# Patient Record
Sex: MALE
[De-identification: names, ages, dates, MRNs, and addresses within clinical notes are randomized per-mention and may not be internally consistent; named-entity substitution may affect disease eponyms.]

---

## 2018-09-04 ENCOUNTER — HOSPITAL ENCOUNTER (OUTPATIENT)
Dept: HOSPITAL 31 - C.ER | Age: 63
Setting detail: OBSERVATION
LOS: 2 days | Discharge: HOME | End: 2018-09-06
Attending: INTERNAL MEDICINE | Admitting: INTERNAL MEDICINE
Payer: COMMERCIAL

## 2018-09-04 VITALS — RESPIRATION RATE: 20 BRPM

## 2018-09-04 DIAGNOSIS — I10: ICD-10-CM

## 2018-09-04 DIAGNOSIS — E10.9: ICD-10-CM

## 2018-09-04 DIAGNOSIS — L50.9: Primary | ICD-10-CM

## 2018-09-04 DIAGNOSIS — Z79.4: ICD-10-CM

## 2018-09-04 DIAGNOSIS — D72.829: ICD-10-CM

## 2018-09-04 LAB
ALBUMIN SERPL-MCNC: 4.1 G/DL (ref 3.5–5)
ALBUMIN/GLOB SERPL: 1.3 {RATIO} (ref 1–2.1)
ALT SERPL-CCNC: 25 U/L (ref 21–72)
AST SERPL-CCNC: 37 U/L (ref 17–59)
BASE EXCESS BLDV CALC-SCNC: -4.6 MMOL/L (ref 0–2)
BASOPHILS # BLD AUTO: 0.1 K/UL (ref 0–0.2)
BASOPHILS NFR BLD: 0.8 % (ref 0–2)
BILIRUB UR-MCNC: NEGATIVE MG/DL
BUN SERPL-MCNC: 19 MG/DL (ref 9–20)
CALCIUM SERPL-MCNC: 9.2 MG/DL (ref 8.6–10.4)
EOSINOPHIL # BLD AUTO: 0 K/UL (ref 0–0.7)
EOSINOPHIL NFR BLD: 0.2 % (ref 0–4)
ERYTHROCYTE [DISTWIDTH] IN BLOOD BY AUTOMATED COUNT: 13.6 % (ref 11.5–14.5)
GFR NON-AFRICAN AMERICAN: > 60
GLUCOSE UR STRIP-MCNC: (no result) MG/DL
HGB BLD-MCNC: 16.9 G/DL (ref 12–18)
LEUKOCYTE ESTERASE UR-ACNC: (no result) LEU/UL
LYMPHOCYTES # BLD AUTO: 1.8 K/UL (ref 1–4.3)
LYMPHOCYTES NFR BLD AUTO: 12.4 % (ref 20–40)
MCH RBC QN AUTO: 27.8 PG (ref 27–31)
MCHC RBC AUTO-ENTMCNC: 34.1 G/DL (ref 33–37)
MCV RBC AUTO: 81.6 FL (ref 80–94)
MONOCYTES # BLD: 0.4 K/UL (ref 0–0.8)
MONOCYTES NFR BLD: 2.9 % (ref 0–10)
NEUTROPHILS # BLD: 12.2 K/UL (ref 1.8–7)
NEUTROPHILS NFR BLD AUTO: 83.7 % (ref 50–75)
NRBC BLD AUTO-RTO: 0.1 % (ref 0–2)
PCO2 BLDV: 32 MMHG (ref 40–60)
PH BLDV: 7.39 [PH] (ref 7.32–7.43)
PH UR STRIP: 5 [PH] (ref 5–8)
PLATELET # BLD: 235 K/UL (ref 130–400)
PMV BLD AUTO: 8.5 FL (ref 7.2–11.7)
PROT UR STRIP-MCNC: NEGATIVE MG/DL
RBC # BLD AUTO: 6.05 MIL/UL (ref 4.4–5.9)
RBC # UR STRIP: NEGATIVE /UL
SP GR UR STRIP: 1.03 (ref 1–1.03)
UROBILINOGEN UR-MCNC: NORMAL MG/DL (ref 0.2–1)
VENOUS BLOOD GAS PO2: 49 MM/HG (ref 30–55)
WBC # BLD AUTO: 14.6 K/UL (ref 4.8–10.8)

## 2018-09-04 PROCEDURE — 86039 ANTINUCLEAR ANTIBODIES (ANA): CPT

## 2018-09-04 PROCEDURE — 81001 URINALYSIS AUTO W/SCOPE: CPT

## 2018-09-04 PROCEDURE — 80053 COMPREHEN METABOLIC PANEL: CPT

## 2018-09-04 PROCEDURE — 87086 URINE CULTURE/COLONY COUNT: CPT

## 2018-09-04 PROCEDURE — 86703 HIV-1/HIV-2 1 RESULT ANTBDY: CPT

## 2018-09-04 PROCEDURE — 85651 RBC SED RATE NONAUTOMATED: CPT

## 2018-09-04 PROCEDURE — 71045 X-RAY EXAM CHEST 1 VIEW: CPT

## 2018-09-04 PROCEDURE — 86162 COMPLEMENT TOTAL (CH50): CPT

## 2018-09-04 PROCEDURE — 99285 EMERGENCY DEPT VISIT HI MDM: CPT

## 2018-09-04 PROCEDURE — 86140 C-REACTIVE PROTEIN: CPT

## 2018-09-04 PROCEDURE — 96372 THER/PROPH/DIAG INJ SC/IM: CPT

## 2018-09-04 PROCEDURE — 82948 REAGENT STRIP/BLOOD GLUCOSE: CPT

## 2018-09-04 PROCEDURE — 82607 VITAMIN B-12: CPT

## 2018-09-04 PROCEDURE — 36415 COLL VENOUS BLD VENIPUNCTURE: CPT

## 2018-09-04 PROCEDURE — 96374 THER/PROPH/DIAG INJ IV PUSH: CPT

## 2018-09-04 PROCEDURE — 86592 SYPHILIS TEST NON-TREP QUAL: CPT

## 2018-09-04 PROCEDURE — 86038 ANTINUCLEAR ANTIBODIES: CPT

## 2018-09-04 PROCEDURE — 82803 BLOOD GASES ANY COMBINATION: CPT

## 2018-09-04 PROCEDURE — 82784 ASSAY IGA/IGD/IGG/IGM EACH: CPT

## 2018-09-04 PROCEDURE — 85025 COMPLETE CBC W/AUTO DIFF WBC: CPT

## 2018-09-04 PROCEDURE — 87040 BLOOD CULTURE FOR BACTERIA: CPT

## 2018-09-04 PROCEDURE — 86360 T CELL ABSOLUTE COUNT/RATIO: CPT

## 2018-09-04 PROCEDURE — 86160 COMPLEMENT ANTIGEN: CPT

## 2018-09-04 PROCEDURE — 86738 MYCOPLASMA ANTIBODY: CPT

## 2018-09-04 RX ADMIN — METHYLPREDNISOLONE SODIUM SUCCINATE SCH MG: 40 INJECTION, POWDER, FOR SOLUTION INTRAMUSCULAR; INTRAVENOUS at 21:53

## 2018-09-04 RX ADMIN — METHYLPREDNISOLONE SODIUM SUCCINATE SCH: 40 INJECTION, POWDER, FOR SOLUTION INTRAMUSCULAR; INTRAVENOUS at 15:21

## 2018-09-04 RX ADMIN — HUMAN INSULIN SCH UNIT: 100 INJECTION, SOLUTION SUBCUTANEOUS at 17:08

## 2018-09-04 RX ADMIN — HUMAN INSULIN SCH UNIT: 100 INJECTION, SOLUTION SUBCUTANEOUS at 23:12

## 2018-09-04 NOTE — C.PDOC
History Of Present Illness


62 y/o  male, w/PMhx of diabetes, presents to the ER complaining of red raised 

rash and itching to bilateral anterior cubital fossa , hands, feet, and back of 

head which has been present for the past few days. Patient states that he was 

seen in [facility] 3 days and he was prescribed Cephalaxin, Prednisone and Benadryl. 

However, he felt worse so he returned to [facility] for a 2nd visit the same day. He 

continued taking the medications with out relief. His symptoms worsened and he 

has pain and swelling to bottom of bilateral feet. He is unable to bear weight. 

Denies having fever, cough, runny nose, chest pain, SOB, nausea, vomiting, and 

diarrhea.


Time Seen by Provider: 09/04/18 10:16


Chief Complaint (Nursing): Abnormal Skin Integrity


History Per: Patient


History/Exam Limitations: no limitations


Onset/Duration Of Symptoms: Days


Current Symptoms Are (Timing): Still Present


Severity: Moderate





Past Medical History


Reviewed: Historical Data, Nursing Documentation, Vital Signs


Vital Signs: 


 Last Vital Signs











Temp  99.0 F   09/04/18 10:29


 


Pulse  119 H  09/04/18 10:14


 


Resp  18   09/04/18 10:14


 


BP  153/84 H  09/04/18 10:14


 


Pulse Ox  96   09/04/18 12:14














- Medical History


PMH: Diabetes


Surgical History: No Surg Hx


Family History: States: No Known Family Hx





Review Of Systems


Except As Marked, All Systems Reviewed And Found Negative.


Constitutional: Negative for: Fever, Chills


ENT: Negative for: Nose Discharge


Cardiovascular: Negative for: Chest Pain


Respiratory: Negative for: Cough, Shortness of Breath


Gastrointestinal: Negative for: Nausea, Vomiting, Diarrhea


Skin: Positive for: Rash (anterior cubital fossa, hands, feet, torso, back of 

head)





Physical Exam





- Physical Exam


Appears: Non-toxic, No Acute Distress


Skin: Warm, Dry, Rash (raised urticaria to bilateral anterior cubital fossa , 

hands, feet, and back of head )


Head: Atraumatic, Normacephalic


Eye(s): bilateral: Normal Inspection


Nose: Normal


Oral Mucosa: Moist


Throat: Normal, No Erythema, No Exudate


Neck: Supple


Chest: Symmetrical


Cardiovascular: Rhythm Regular


Respiratory: Normal Breath Sounds, No Rales, No Rhonchi, No Wheezing


Neurological/Psych: Oriented x3, Normal Speech





ED Course And Treatment





- Laboratory Results


Result Diagrams: 


 09/04/18 10:47





 09/04/18 10:47


O2 Sat by Pulse Oximetry: 96 (RA)


Pulse Ox Interpretation: Normal





- Other Rad


  ** CXR


X-Ray: Viewed By Me, Read By Radiologist


Interpretation: Date of service:  09/04/2018.  HISTORY:  Sepsis Patient.  

COMPARISON:  No prior.  FINDINGS:  LUNGS:  Low-normal inspiration.  No 

consolidation.  PLEURA:  No significant pleural effusion identified, no 

pneumothorax apparent.  CARDIOVASCULAR:  Normal. Probable prominent vessels 

seen on end the perihilar location.  OSSEOUS STRUCTURES:  No significant 

abnormalities.  VISUALIZED UPPER ABDOMEN:  Mild asymmetrical elevation the 

right hemidiaphragm.  OTHER FINDINGS:  None.  IMPRESSION:  No consolidation to 

suggest an infiltrate.





- Physician Consult Information


Time Consulting Physician Contacted: 12:08


Physician Contacted: Harvey Brennan


Outcome Of Conversation: Dr.Pandya olivares consulting with ID.





Medical Decision Making


Medical Decision Making: 


Plan:


--Labs


--UA


--CXR


--Benadryl IV


--Prednisone IV


--Morphine IV





Disposition


Discussed With Dr.: Harvey Brennan


Counseled Patient/Family Regarding: Studies Performed, Need For Followup





- Disposition


Disposition: HOSPITALIZED


Disposition Time: 12:09


Forms:  [employer] (English)





- Clinical Impression


Clinical Impression: 


 Skin lesion, Hives








- Scribe Statement


The provider has reviewed the documentation as recorded by the Yulianaibartur Arteaga


Provider Attestation: 





All medical record entries made by the Scribe were at my direction and 

personally dictated by me. I have reviewed the chart and agree that the record 

accurately reflects my personal performance of the history, physical exam, 

medical decision making, and the department course for this patient. I have 

also personally directed, reviewed, and agree with the discharge instructions 

and disposition.





Decision To Admit





- Pt Status Changed To:


Hospital Disposition Of: Observation





- .


Bed Request Type: Regular


Admitting Physician: Harvey Brennan


Patient Diagnosis: 


 Skin lesion, Hives

## 2018-09-04 NOTE — RAD
Date of service: 



09/04/2018



HISTORY:

Sepsis Patient  



COMPARISON:

No prior. 



FINDINGS:



LUNGS:

Low-normal inspiration.



No consolidation.



PLEURA:

No significant pleural effusion identified, no pneumothorax apparent.



CARDIOVASCULAR:

Normal. Probable prominent vessels seen on end the perihilar 

location. 



OSSEOUS STRUCTURES:

No significant abnormalities.



VISUALIZED UPPER ABDOMEN:

Mild asymmetrical elevation the right hemidiaphragm



OTHER FINDINGS:

None.



IMPRESSION:

No consolidation to suggest an infiltrate.

## 2018-09-04 NOTE — CP.PCM.HP
History of Present Illness





- History of Present Illness


History of Present Illness: 





CHIEF COMPLAINTS TODAY :


Pain in the bilateral feet and hands secondary to new skin lesion


2 days ago patient broke out skin lesions urticarial type both feet in the 

process of the upper and lower extremities.  Presented to [facility] was given oral antibiotics and prednisone without any relief 

patient continues to have increased pain in both feet and unable to walk.


Patient has a history of diabetes no previous history of any allergies or skin 

lesions or travel outside the country





ROS.


HEENT :  N.


Resp :       No cough, wheezing ,pleuritic CP ,or hemoptysis 


Cardio :     No anginal  CP, PND, orthopnea, palpitation 


GI :           No abd.pain, n/v ,diarrhea or GI bleeding .


CNS : No headache, vertigo, focal deficit.


Musculoskel :  No joint swelling ,


Derm :        Urticarial rash


Psych :     Normal affect.


Ext :  No  swelling ,calf pain 





PE.


Pt. is alert awake in no distress.


V.S  As noted in the chart 


Head ,ear nose,throat and eyes : Normal.


Neck : Supple with normal carotids.


Lungs: Clear air entry.


Heart : S1 & S2 normal with S4. No murmur.


Abd : Soft non tender with normal bowel sounds.


Neuro : Moves all ext. with no localized deficit.


Ext : No edema with intact pulses.Non tender calves 


Derm : Erythematous urticarial wheals on both antecubital and knee fossa.


LABS/RADIOLOGY:





ASSESSMENT/PLAN : 


Generalized urticaria etiology unclear


Type 2 diabetes.








Present on Admission





- Present on Admission


Any Indicators Present on Admission: No





Past Patient History





- Past Social History


Smoking Status: Never Smoked





- CARDIAC


Hx Hypertension: Yes





- ENDOCRINE/METABOLIC


Hx Diabetes Mellitus Type 1: Yes





- PSYCHIATRIC


Hx Substance Use: No





- SURGICAL HISTORY


Hx Surgeries: No





- ANESTHESIA


Hx Anesthesia: No





Meds


Allergies/Adverse Reactions: 


 Allergies











Allergy/AdvReac Type Severity Reaction Status Date / Time


 


No Known Allergies Allergy   Verified 09/04/18 10:17














Results





- Vital Signs


Recent Vital Signs: 





 Last Vital Signs











Temp  99.1 F   09/04/18 12:23


 


Pulse  118 H  09/04/18 12:23


 


Resp  20   09/04/18 12:23


 


BP  161/81 H  09/04/18 12:23


 


Pulse Ox  98   09/04/18 12:23














- Labs


Result Diagrams: 


 09/04/18 10:47





 09/04/18 10:47


Labs: 





 Laboratory Results - last 24 hr











  09/04/18 09/04/18 09/04/18





  10:47 10:47 11:47


 


WBC  14.6 H  


 


RBC  6.05 H  


 


Hgb  16.9  


 


Hct  49.4  


 


MCV  81.6  


 


MCH  27.8  


 


MCHC  34.1  


 


RDW  13.6  


 


Plt Count  235  


 


MPV  8.5  


 


Neut % (Auto)  83.7 H  


 


Lymph % (Auto)  12.4 L  


 


Mono % (Auto)  2.9  


 


Eos % (Auto)  0.2  


 


Baso % (Auto)  0.8  


 


Neut # (Auto)  12.2 H  


 


Lymph # (Auto)  1.8  


 


Mono # (Auto)  0.4  


 


Eos # (Auto)  0.0  


 


Baso # (Auto)  0.1  


 


ESR   


 


pO2    49


 


VBG pH    7.39


 


VBG pCO2    32 L


 


VBG HCO3    21.0


 


VBG Total CO2    20.4 L


 


VBG O2 Sat (Calc)    88.5 H


 


VBG Base Excess    -4.6 L


 


VBG Potassium    3.8


 


Glucose    235 H


 


Lactate    2.5 H


 


Sodium   140  137.0


 


Potassium   4.4 


 


Chloride   103  109.0 H


 


Carbon Dioxide   19 L 


 


Anion Gap   22 H 


 


BUN   19 


 


Creatinine   0.7 L 


 


Est GFR ( Amer)   > 60 


 


Est GFR (Non-Af Amer)   > 60 


 


Random Glucose   273 H 


 


Calcium   9.2 


 


Total Bilirubin   0.9 


 


AST   37 


 


ALT   25 


 


Alkaline Phosphatase   74 


 


C-React Prot High Sens   


 


Total Protein   7.4 


 


Albumin   4.1 


 


Globulin   3.3 


 


Albumin/Globulin Ratio   1.3 


 


Venous Blood Potassium    3.8


 


Urine Color   


 


Urine Clarity   


 


Urine pH   


 


Ur Specific Gravity   


 


Urine Protein   


 


Urine Glucose (UA)   


 


Urine Ketones   


 


Urine Blood   


 


Urine Nitrate   


 


Urine Bilirubin   


 


Urine Urobilinogen   


 


Ur Leukocyte Esterase   


 


Urine WBC (Auto)   














  09/04/18 09/04/18 09/04/18





  12:03 12:12 12:12


 


WBC   


 


RBC   


 


Hgb   


 


Hct   


 


MCV   


 


MCH   


 


MCHC   


 


RDW   


 


Plt Count   


 


MPV   


 


Neut % (Auto)   


 


Lymph % (Auto)   


 


Mono % (Auto)   


 


Eos % (Auto)   


 


Baso % (Auto)   


 


Neut # (Auto)   


 


Lymph # (Auto)   


 


Mono # (Auto)   


 


Eos # (Auto)   


 


Baso # (Auto)   


 


ESR   13 


 


pO2   


 


VBG pH   


 


VBG pCO2   


 


VBG HCO3   


 


VBG Total CO2   


 


VBG O2 Sat (Calc)   


 


VBG Base Excess   


 


VBG Potassium   


 


Glucose   


 


Lactate   


 


Sodium   


 


Potassium   


 


Chloride   


 


Carbon Dioxide   


 


Anion Gap   


 


BUN   


 


Creatinine   


 


Est GFR ( Amer)   


 


Est GFR (Non-Af Amer)   


 


Random Glucose   


 


Calcium   


 


Total Bilirubin   


 


AST   


 


ALT   


 


Alkaline Phosphatase   


 


C-React Prot High Sens    > 15.00 H


 


Total Protein   


 


Albumin   


 


Globulin   


 


Albumin/Globulin Ratio   


 


Venous Blood Potassium   


 


Urine Color  Yellow  


 


Urine Clarity  Clear  


 


Urine pH  5.0  


 


Ur Specific Gravity  1.029  


 


Urine Protein  Negative  


 


Urine Glucose (UA)  3+ H  


 


Urine Ketones  Negative  


 


Urine Blood  Negative  


 


Urine Nitrate  Negative  


 


Urine Bilirubin  Negative  


 


Urine Urobilinogen  Normal  


 


Ur Leukocyte Esterase  Neg  


 


Urine WBC (Auto)  < 1

## 2018-09-04 NOTE — CP.PCM.CON
History of Present Illness





- History of Present Illness


History of Present Illness: 


INFECTIOUS DISEASE CONSULT;


HPI;


63-year-old male with history of insulin-dependent diabetes mellitus, who 

presents to [facility] ER on 9/4/18 with complains of generalized 

urticarial rash on trunk lower extremities bilateral hands and feet and also to 

the face with edema of the hands and as reported by the daughter facial edema.


Patient had presented to [facility] 3 days ago and was 

prescribed Keflex, prednisone and Benadryl, however he felt worse so he 

returned to [facility] for a second visit to same day.  He was treated with steroids 

and discharged with same medications.  His symptoms worsened especially the 

pain both feet with  swelling off both his feet and hands.  He states he was 

unable to bear weight and decided to come to the ER.


Patient denies having fever, cough, shortness of breath, dysphagia, nausea, 

vomiting or diarrhea.


Patient advised admission for observation and started on steroids, Benadryl and 

H2 blockers, and analgesics.


Patient denies any an allergic trigger was on per any new medication, food or 

insect sting.  States this is the first time it has ever happened.


patient denies any recent history off upper respiratory tract infection or 

intake of seafood.


Patient denies any history off allergies or previous skin conditions or recent 

travel.


INFECTIOUS DISEASE CONSULTATION REQUESTE BY PMD LEUKOCYTOSIS , HIVES VERSUS 

ALLERGIC REACTION.








PMH: Diabetes


Surgical History: No Surg Hx


Family History: States: No Known Family Hx.





Allergies;; NKA.


























Review of Systems





- Constitutional


Constitutional: absent: Chills, Fever, Weight Loss





- EENT


Eyes: absent: Change in Vision


Ears: absent: Ear Discharge, Ear Pain


Nose/Mouth/Throat: absent: Nasal Congestion, Nasal Obstruction, Dysphagia, 

Hoarsness, Mouth Lesions, Throat Swelling, Tongue Swelling, Neck Pain





- Cardiovascular


Cardiovascular: Pedal Edema.  absent: Chest Pain, Dyspnea





- Respiratory


Respiratory: absent: Cough, Dyspnea, Chest Congestion





- Gastrointestinal


Gastrointestinal: absent: Abdominal Pain, Diarrhea, Nausea, Odynophagia, 

Vomiting





- Genitourinary


Genitourinary: absent: Difficulty Urinating, Dysuria, Bladder Distension





- Musculoskeletal


Additional comments: 





BILATERAL HAND SWELLING AND ERYTHEMA AND SWELLING BOTH FEET PLANTAR ASPECT.





- Neurological


Neurological: Burning Sensations (BILATERAL FEETAND HANDS.).  absent: Headaches





- Hematologic/Lymphatic


Hematologic: As Per HPI.  absent: Lymphadenopathy





Past Patient History





- Past Social History


Smoking Status: Never Smoked





- CARDIAC


Hx Hypertension: Yes





- ENDOCRINE/METABOLIC


Hx Diabetes Mellitus Type 1: Yes





- PSYCHIATRIC


Hx Substance Use: No





- SURGICAL HISTORY


Hx Surgeries: No





- ANESTHESIA


Hx Anesthesia: No





Meds


Allergies/Adverse Reactions: 


 Allergies











Allergy/AdvReac Type Severity Reaction Status Date / Time


 


No Known Allergies Allergy   Verified 09/04/18 10:17














- Medications


Medications: 


 Current Medications





Diphenhydramine HCl (Benadryl)  50 mg IM Q8H PRN


   PRN Reason: Allergy symptoms


Docusate Sodium (Colace)  100 mg PO DAILY [facility]


Gabapentin (Neurontin)  300 mg PO BID [facility]


   Last Admin: 09/04/18 18:03 Dose:  300 mg


Heparin Sodium (Porcine) (Heparin)  5,000 units SC Q12 [facility]


   Last Admin: 09/04/18 21:55 Dose:  5,000 units


Insulin Human Regular (Novolin R)  0 unit SC ACHS [facility]


   PRN Reason: Protocol


   Last Admin: 09/04/18 17:08 Dose:  3 unit


Methylprednisolone (Solu-Medrol)  40 mg IVP Q8 [facility]


   Last Admin: 09/04/18 21:53 Dose:  40 mg


Morphine Sulfate (Morphine)  4 mg IV Q6 PRN


   PRN Reason: Pain, moderate (4-7)


Pantoprazole Sodium (Protonix Inj)  40 mg IVP DAILY [facility]


   Last Admin: 09/04/18 17:08 Dose:  40 mg











Physical Exam





- Head Exam


Head Exam: NORMAL INSPECTION, NORMOCEPHALIC





- Eye Exam


Eye Exam: EOMI, PERRL.  absent: Scleral icterus


Pupil Exam: PERRL





- ENT Exam


ENT Exam: Mucous Membranes Moist, Normal Oropharynx


Additional comments: 





SOME FACIAL EDEMA





- Neck Exam


Neck exam: Positive for: Full Rom, Normal Inspection.  Negative for: 

Lymphadenopathy, Thyromegaly





- Respiratory Exam


Respiratory Exam: NORMAL BREATHING PATTERN





- Cardiovascular Exam


Cardiovascular Exam: REGULAR RHYTHM, +S1, +S2





- GI/Abdominal Exam


GI & Abdominal Exam: Normal Bowel Sounds, Soft.  absent: Organomegaly, 

Tenderness





- Extremities Exam


Extremities exam: Positive for: normal capillary refill, pedal edema, tenderness

, pedal pulses present.  Negative for: calf tenderness





- Back Exam


Back exam: rash noted (PATIENT HAS TRUNCAL URTICARIAL RASH EXTENDING ONTO 

BILATERAL LOWER EXTREMITIES, UPPER EXTREMITIES, FACIAL AND BILATERAL FEET AND 

HANDS INCLUDING, PALMS AND SOLES.)





- Neurological Exam


Neurological exam: Alert, CN II-XII Intact, Oriented x3, Reflexes Normal





- Psychiatric Exam


Psychiatric exam: Normal Mood





- Skin


Skin Exam: Normal Color, Urticaria, Warm





Results





- Vital Signs


Recent Vital Signs: 


 Last Vital Signs











Temp  97.9 F   09/04/18 18:57


 


Pulse  100 H  09/04/18 18:57


 


Resp  20   09/04/18 18:57


 


BP  137/75   09/04/18 18:57


 


Pulse Ox  94 L  09/04/18 18:57














- Labs


Result Diagrams: 


 09/04/18 10:47





 09/04/18 10:47


Labs: 


 Laboratory Results - last 24 hr











  09/04/18 09/04/18 09/04/18





  10:47 10:47 11:47


 


WBC  14.6 H  


 


RBC  6.05 H  


 


Hgb  16.9  


 


Hct  49.4  


 


MCV  81.6  


 


MCH  27.8  


 


MCHC  34.1  


 


RDW  13.6  


 


Plt Count  235  


 


MPV  8.5  


 


Neut % (Auto)  83.7 H  


 


Lymph % (Auto)  12.4 L  


 


Mono % (Auto)  2.9  


 


Eos % (Auto)  0.2  


 


Baso % (Auto)  0.8  


 


Neut # (Auto)  12.2 H  


 


Lymph # (Auto)  1.8  


 


Mono # (Auto)  0.4  


 


Eos # (Auto)  0.0  


 


Baso # (Auto)  0.1  


 


ESR   


 


pO2    49


 


VBG pH    7.39


 


VBG pCO2    32 L


 


VBG HCO3    21.0


 


VBG Total CO2    20.4 L


 


VBG O2 Sat (Calc)    88.5 H


 


VBG Base Excess    -4.6 L


 


VBG Potassium    3.8


 


Glucose    235 H


 


Lactate    2.5 H


 


Sodium   140  137.0


 


Potassium   4.4 


 


Chloride   103  109.0 H


 


Carbon Dioxide   19 L 


 


Anion Gap   22 H 


 


BUN   19 


 


Creatinine   0.7 L 


 


Est GFR ( Amer)   > 60 


 


Est GFR (Non-Af Amer)   > 60 


 


POC Glucose (mg/dL)   


 


Random Glucose   273 H 


 


Calcium   9.2 


 


Total Bilirubin   0.9 


 


AST   37 


 


ALT   25 


 


Alkaline Phosphatase   74 


 


C-React Prot High Sens   


 


Total Protein   7.4 


 


Albumin   4.1 


 


Globulin   3.3 


 


Albumin/Globulin Ratio   1.3 


 


Vitamin B12   


 


Venous Blood Potassium    3.8


 


Urine Color   


 


Urine Clarity   


 


Urine pH   


 


Ur Specific Gravity   


 


Urine Protein   


 


Urine Glucose (UA)   


 


Urine Ketones   


 


Urine Blood   


 


Urine Nitrate   


 


Urine Bilirubin   


 


Urine Urobilinogen   


 


Ur Leukocyte Esterase   


 


Urine WBC (Auto)   














  09/04/18 09/04/18 09/04/18





  12:03 12:12 12:12


 


WBC   


 


RBC   


 


Hgb   


 


Hct   


 


MCV   


 


MCH   


 


MCHC   


 


RDW   


 


Plt Count   


 


MPV   


 


Neut % (Auto)   


 


Lymph % (Auto)   


 


Mono % (Auto)   


 


Eos % (Auto)   


 


Baso % (Auto)   


 


Neut # (Auto)   


 


Lymph # (Auto)   


 


Mono # (Auto)   


 


Eos # (Auto)   


 


Baso # (Auto)   


 


ESR   13 


 


pO2   


 


VBG pH   


 


VBG pCO2   


 


VBG HCO3   


 


VBG Total CO2   


 


VBG O2 Sat (Calc)   


 


VBG Base Excess   


 


VBG Potassium   


 


Glucose   


 


Lactate   


 


Sodium   


 


Potassium   


 


Chloride   


 


Carbon Dioxide   


 


Anion Gap   


 


BUN   


 


Creatinine   


 


Est GFR ( Amer)   


 


Est GFR (Non-Af Amer)   


 


POC Glucose (mg/dL)   


 


Random Glucose   


 


Calcium   


 


Total Bilirubin   


 


AST   


 


ALT   


 


Alkaline Phosphatase   


 


C-React Prot High Sens    > 15.00 H


 


Total Protein   


 


Albumin   


 


Globulin   


 


Albumin/Globulin Ratio   


 


Vitamin B12   


 


Venous Blood Potassium   


 


Urine Color  Yellow  


 


Urine Clarity  Clear  


 


Urine pH  5.0  


 


Ur Specific Gravity  1.029  


 


Urine Protein  Negative  


 


Urine Glucose (UA)  3+ H  


 


Urine Ketones  Negative  


 


Urine Blood  Negative  


 


Urine Nitrate  Negative  


 


Urine Bilirubin  Negative  


 


Urine Urobilinogen  Normal  


 


Ur Leukocyte Esterase  Neg  


 


Urine WBC (Auto)  < 1  














  09/04/18 09/04/18 09/04/18





  16:48 19:35 21:16


 


WBC   


 


RBC   


 


Hgb   


 


Hct   


 


MCV   


 


MCH   


 


MCHC   


 


RDW   


 


Plt Count   


 


MPV   


 


Neut % (Auto)   


 


Lymph % (Auto)   


 


Mono % (Auto)   


 


Eos % (Auto)   


 


Baso % (Auto)   


 


Neut # (Auto)   


 


Lymph # (Auto)   


 


Mono # (Auto)   


 


Eos # (Auto)   


 


Baso # (Auto)   


 


ESR   


 


pO2   


 


VBG pH   


 


VBG pCO2   


 


VBG HCO3   


 


VBG Total CO2   


 


VBG O2 Sat (Calc)   


 


VBG Base Excess   


 


VBG Potassium   


 


Glucose   


 


Lactate   


 


Sodium   


 


Potassium   


 


Chloride   


 


Carbon Dioxide   


 


Anion Gap   


 


BUN   


 


Creatinine   


 


Est GFR ( Amer)   


 


Est GFR (Non-Af Amer)   


 


POC Glucose (mg/dL)  201 H   324 H


 


Random Glucose   


 


Calcium   


 


Total Bilirubin   


 


AST   


 


ALT   


 


Alkaline Phosphatase   


 


C-React Prot High Sens   


 


Total Protein   


 


Albumin   


 


Globulin   


 


Albumin/Globulin Ratio   


 


Vitamin B12   439 


 


Venous Blood Potassium   


 


Urine Color   


 


Urine Clarity   


 


Urine pH   


 


Ur Specific Gravity   


 


Urine Protein   


 


Urine Glucose (UA)   


 


Urine Ketones   


 


Urine Blood   


 


Urine Nitrate   


 


Urine Bilirubin   


 


Urine Urobilinogen   


 


Ur Leukocyte Esterase   


 


Urine WBC (Auto)   














- Imaging and Cardiology


  ** Chest x-ray


Status: Report reviewed by me (no acute infiltrate.)





Assessment & Plan


(1) Urticaria


Assessment and Plan: 


PATIENT HAS URTICARIA


R/O ALLERGIC VS  IMMUNE MEDIATED VASCULITIS.


 R/O  ANGIOEDEMA ? ALLERGIC VS AQUIRED





CHECK ABELARDO.


IMMUNOGLOBULINS-IGE,IGA,IGG,IGM.


C2,C4, CH50.


MYCOPLASMA iGm.


RPR.


VIT B12.





CONTINUE iv sOLU-mEDROL, bENADRYL, AND ANALGESICS.


START IV PROTONIX 40 MG ONCE A DAY DAILY.


F/U BLOOD CULTURES, ua AND URINE CULTURES.





WATCH FOR ANY RESPIRATORY COMPROMISE. 








Status: Acute   





(2) Diabetes mellitus


Assessment and Plan: 


HEMOGLOBIN a1C.


aDEQUATE CONTROL OF BLOOD SUGARS AS PATIENT ON STEROIDS.


Status: Acute   





(3) Hypertension


Assessment and Plan: 


PATIENT ON COREG 


nOT ON aCE THERAPY


Status: Acute

## 2018-09-05 LAB
ANA PATTERN: (no result)
IGA SERPL-MCNC: 268.8 MG/DL (ref 70–400)
IGM SERPL-MCNC: < 25 MG/DL (ref 40–230)

## 2018-09-05 RX ADMIN — INSULIN GLARGINE SCH UNITS: 100 INJECTION, SOLUTION SUBCUTANEOUS at 17:12

## 2018-09-05 RX ADMIN — HUMAN INSULIN SCH UNIT: 100 INJECTION, SOLUTION SUBCUTANEOUS at 16:56

## 2018-09-05 RX ADMIN — HUMAN INSULIN SCH UNIT: 100 INJECTION, SOLUTION SUBCUTANEOUS at 21:58

## 2018-09-05 RX ADMIN — DIPHENHYDRAMINE HYDROCHLORIDE PRN MG: 50 INJECTION INTRAMUSCULAR; INTRAVENOUS at 08:36

## 2018-09-05 RX ADMIN — METHYLPREDNISOLONE SODIUM SUCCINATE SCH MG: 40 INJECTION, POWDER, FOR SOLUTION INTRAMUSCULAR; INTRAVENOUS at 06:08

## 2018-09-05 RX ADMIN — DIPHENHYDRAMINE HYDROCHLORIDE PRN MG: 50 INJECTION INTRAMUSCULAR; INTRAVENOUS at 16:45

## 2018-09-05 RX ADMIN — METHYLPREDNISOLONE SODIUM SUCCINATE SCH MG: 40 INJECTION, POWDER, FOR SOLUTION INTRAMUSCULAR; INTRAVENOUS at 13:10

## 2018-09-05 RX ADMIN — HUMAN INSULIN SCH UNIT: 100 INJECTION, SOLUTION SUBCUTANEOUS at 08:21

## 2018-09-05 RX ADMIN — METHYLPREDNISOLONE SODIUM SUCCINATE SCH MG: 40 INJECTION, POWDER, FOR SOLUTION INTRAMUSCULAR; INTRAVENOUS at 21:21

## 2018-09-05 RX ADMIN — HUMAN INSULIN SCH UNIT: 100 INJECTION, SOLUTION SUBCUTANEOUS at 11:47

## 2018-09-05 NOTE — CP.PCM.PN
Subjective





- Date & Time of Evaluation


Date of Evaluation: 09/05/18


Time of Evaluation: 13:40





- Subjective


Subjective: 





CHIEF COMPLAINTS TODAY :


Less pain and swelling of the both upper and lower extremities





ROS.


HEENT :  N.


Resp :       No cough, wheezing ,pleuritic CP ,or hemoptysis 


Cardio :     No anginal  CP, PND, orthopnea, palpitation 


GI :           No abd.pain, n/v ,diarrhea or GI bleeding .


CNS : No headache, vertigo, focal deficit.


Musculoskel :  No joint swelling ,


Derm urticarial rash 


Psych :     Normal affect.


Ext :  No  swelling ,calf pain 





PE.


Pt. is alert awake in no distress.


V.S  As noted in the chart 


Head ,ear nose,throat and eyes : Normal.


Neck : Supple with normal carotids.


Lungs: Clear air entry.


Heart : S1 & S2 normal with S4. No murmur.


Abd : Soft non tender with normal bowel sounds.


Neuro : Moves all ext. with no localized deficit.


Ext : No edema with intact pulses.Non tender calves with swelling of the palms 

and soles are receding


Derm : Erythematous raised wheals on the upper extremities


LABS/RADIOLOGY:





ASSESSMENT/PLAN : 


Continue present medications


Sugars are up secondary to steroids at all his diabetic medications taken at 

home








Objective





- Vital Signs/Intake and Output


Vital Signs (last 24 hours): 


 











Temp Pulse Resp BP Pulse Ox


 


 98.0 F   84   20   141/70   97 


 


 09/05/18 08:18  09/05/18 08:18  09/05/18 08:18  09/05/18 08:18  09/05/18 08:18








Intake and Output: 


 











 09/05/18 09/05/18





 11:59 23:59


 


Intake Total 200 


 


Balance 200 














- Medications


Medications: 


 Current Medications





Diphenhydramine HCl (Benadryl)  50 mg IM Q8H PRN


   PRN Reason: Allergy symptoms


   Last Admin: 09/05/18 08:36 Dose:  50 mg


Docusate Sodium (Colace)  100 mg PO DAILY [facility]


   Last Admin: 09/05/18 10:16 Dose:  100 mg


Gabapentin (Neurontin)  300 mg PO BID [facility]


   Last Admin: 09/05/18 10:16 Dose:  300 mg


Heparin Sodium (Porcine) (Heparin)  5,000 units SC Q12 [facility]


   Last Admin: 09/05/18 10:16 Dose:  5,000 units


Insulin Human Regular (Novolin R)  0 unit SC ACHS [facility]


   PRN Reason: Protocol


   Last Admin: 09/05/18 11:47 Dose:  8 unit


Methylprednisolone (Solu-Medrol)  40 mg IVP Q8 [facility]


   Last Admin: 09/05/18 13:10 Dose:  40 mg


Morphine Sulfate (Morphine)  4 mg IV Q6 PRN


   PRN Reason: Pain, moderate (4-7)


   Last Admin: 09/05/18 00:06 Dose:  4 mg


Pantoprazole Sodium (Protonix Inj)  40 mg IVP DAILY [facility]


   Last Admin: 09/05/18 10:16 Dose:  40 mg











- Labs


Labs: 


 





 09/04/18 10:47 





 09/04/18 10:47

## 2018-09-05 NOTE — CP.PCM.PN
Subjective





- Date & Time of Evaluation


Date of Evaluation: 09/05/18


Time of Evaluation: 17:53





- Subjective


Subjective: 





CHIEF COMPLAINTS TODAY :


afebrile,


C/O ITCHING


less facial swelling


denies  SOB/OR DYSPHAGIA


Less pain and swelling of the both upper and lower extremities





ROS.


HEENT :  N.


Resp :       No cough, wheezing ,pleuritic CP ,or hemoptysis 


Cardio :     No anginal  CP, PND, orthopnea, palpitation 


GI :           No abd.pain, n/v ,diarrhea or GI bleeding .


CNS : No headache, vertigo, focal deficit.


Musculoskel :  No joint swelling ,


Derm   URTICARIAL RASH MUCH IMPROVED.


Psych :     Normal affect.


Ext :  No  swelling ,calf pain 





PE.


Pt. is alert awake in no distress.


V.S  As noted in the chart 


Head ,ear nose,throat and eyes : Normal.


Neck : Supple with normal carotids.


Lungs: Clear air entry.


Heart : S1 & S2 normal with S4. No murmur.


Abd : Soft non tender with normal bowel sounds.


Neuro : Moves all ext. with no localized deficit.


Ext : No edema with intact pulses. SWELLING OF THE PALMS AND SOLES IMPROVING. 

NONTENDER


Derm :  ERYTHEMATOUS RAISED WHEALS ON TRUNK AND UPPER EXTREMITIES FADING AWAY.





LABS/RADIOLOGY:


RPR-NONREACTIVE


ESR 13-NORMAL, 





CRP >15


ABELARDO+VE  POSITIVE- spectacled  1:40


c4 43.2 normal





Objective





- Vital Signs/Intake and Output


Vital Signs (last 24 hours): 


 











Temp Pulse Resp BP Pulse Ox


 


 98.0 F   78   20   134/68   95 


 


 09/05/18 16:33  09/05/18 16:33  09/05/18 16:33  09/05/18 17:25  09/05/18 16:33








Intake and Output: 


 











 09/05/18 09/05/18





 06:59 18:59


 


Intake Total 200 300


 


Balance 200 300














- Medications


Medications: 


 Current Medications





Carvedilol (Coreg)  12.5 mg PO BID Formerly [facility], 1928–1983


   Last Admin: 09/05/18 17:25 Dose:  12.5 mg


Diphenhydramine HCl (Benadryl)  50 mg IM Q8H PRN


   PRN Reason: Allergy symptoms


   Last Admin: 09/05/18 16:45 Dose:  50 mg


Docusate Sodium (Colace)  100 mg PO DAILY [facility]ly [facility], 1928–1983


   Last Admin: 09/05/18 10:16 Dose:  100 mg


Gabapentin (Neurontin)  300 mg PO BID [facility], [facility]–1983


   Last Admin: 09/05/18 17:12 Dose:  300 mg


Heparin Sodium (Porcine) (Heparin)  5,000 units SC Q12 [facility], [facility]–1983


   Last Admin: 09/05/18 10:16 Dose:  5,000 units


Home Med (Empagliflozin [Jardiance])  25 mg PO DAILY [facility], [facility]–1983


Insulin Glargine (Lantus)  45 unit SC BID [facility], [facility]–1983


   Last Admin: 09/05/18 17:12 Dose:  45 units


Insulin Human Regular (Novolin R)  0 unit SC ACHS [facility], [facility]–1983


   PRN Reason: Protocol


   Last Admin: 09/05/18 16:56 Dose:  6 unit


Metformin HCl (Glucophage)  1,000 mg PO BIDCC [facility], 1928–1983


   Last Admin: 09/05/18 16:44 Dose:  1,000 mg


Methylprednisolone (Solu-Medrol)  40 mg IVP Q8 [facility], [facility]


   Last Admin: 09/05/18 13:10 Dose:  40 mg


Morphine Sulfate (Morphine)  4 mg IV Q6 PRN


   PRN Reason: Pain, moderate (4-7)


   Last Admin: 09/05/18 00:06 Dose:  4 mg


Pantoprazole Sodium (Protonix Inj)  40 mg IVP DAILY [facility], [facility]


   Last Admin: 09/05/18 10:16 Dose:  40 mg


Sitagliptin Phosphate (Januvia)  50 mg PO DAILY [facility], [facility]











- Labs


Labs: 


 





 09/04/18 10:47 





 09/04/18 10:47 











Assessment and Plan


(1) Urticaria


Assessment & Plan: 


improving.





CONTINUE iv SOLU-mEDROL, bENADRYL, AND ANALGESICS.





SWITCH TO PO MEDROL PACK IN AM as patient improving


continue IV PROTONIX 40 MG ONCE A DAY DAILY.


 BLOOD CULTURES, URINE CULTURES -ve to date.





Etiology of urticaria not clear ? allergic versus autoimmune vasculitis vs 

idiopathic


AWAIT FURTHER STUDIES


CHECK HIV 1 AND 2 ANTIBODY.


T-CELL SUBSET STUDIES WITH CD4/CD8 RATIOS.





DISCUSSED WITH FAMILY. F/U WITH ALLERGY /IMMUNOLOGY AS OPD.





Status: Acute   





(2) Diabetes mellitus


Assessment & Plan: 


BLOOD SUGAR SLIGHTLY HIGH, MOST PROBABLY SECONDARY TO STEROIDS.


Status: Acute   





(3) Hypertension


Status: Acute

## 2018-09-06 VITALS
TEMPERATURE: 97.4 F | HEART RATE: 67 BPM | DIASTOLIC BLOOD PRESSURE: 68 MMHG | SYSTOLIC BLOOD PRESSURE: 134 MMHG | OXYGEN SATURATION: 96 %

## 2018-09-06 LAB
ALBUMIN SERPL-MCNC: 3.5 G/DL (ref 3.5–5)
ALBUMIN/GLOB SERPL: 1.3 {RATIO} (ref 1–2.1)
ALT SERPL-CCNC: 26 U/L (ref 21–72)
AST SERPL-CCNC: 34 U/L (ref 17–59)
BASOPHILS # BLD AUTO: 0 K/UL (ref 0–0.2)
BASOPHILS NFR BLD: 0.3 % (ref 0–2)
BUN SERPL-MCNC: 24 MG/DL (ref 9–20)
CALCIUM SERPL-MCNC: 8.8 MG/DL (ref 8.6–10.4)
EOSINOPHIL # BLD AUTO: 0 K/UL (ref 0–0.7)
EOSINOPHIL NFR BLD: 0 % (ref 0–4)
ERYTHROCYTE [DISTWIDTH] IN BLOOD BY AUTOMATED COUNT: 13.3 % (ref 11.5–14.5)
GFR NON-AFRICAN AMERICAN: > 60
HGB BLD-MCNC: 12.8 G/DL (ref 12–18)
LYMPHOCYTE: 10 % (ref 20–40)
LYMPHOCYTES # BLD AUTO: 1.1 K/UL (ref 1–4.3)
LYMPHOCYTES NFR BLD AUTO: 9.6 % (ref 20–40)
MCH RBC QN AUTO: 27.9 PG (ref 27–31)
MCHC RBC AUTO-ENTMCNC: 33.7 G/DL (ref 33–37)
MCV RBC AUTO: 82.7 FL (ref 80–94)
MONOCYTE: 1 % (ref 0–10)
MONOCYTES # BLD: 0.3 K/UL (ref 0–0.8)
MONOCYTES NFR BLD: 2.3 % (ref 0–10)
NEUTROPHILS # BLD: 9.7 K/UL (ref 1.8–7)
NEUTROPHILS NFR BLD AUTO: 87.8 % (ref 50–75)
NEUTROPHILS NFR BLD AUTO: 89 % (ref 50–75)
NRBC BLD AUTO-RTO: 0 % (ref 0–2)
PLATELET # BLD EST: NORMAL 10*3/UL
PLATELET # BLD: 203 K/UL (ref 130–400)
PMV BLD AUTO: 8.4 FL (ref 7.2–11.7)
RBC # BLD AUTO: 4.58 MIL/UL (ref 4.4–5.9)
TOTAL CELLS COUNTED BLD: 100
WBC # BLD AUTO: 11.1 K/UL (ref 4.8–10.8)

## 2018-09-06 RX ADMIN — METHYLPREDNISOLONE SODIUM SUCCINATE SCH MG: 40 INJECTION, POWDER, FOR SOLUTION INTRAMUSCULAR; INTRAVENOUS at 14:43

## 2018-09-06 RX ADMIN — INSULIN GLARGINE SCH UNITS: 100 INJECTION, SOLUTION SUBCUTANEOUS at 10:55

## 2018-09-06 RX ADMIN — HUMAN INSULIN SCH UNIT: 100 INJECTION, SOLUTION SUBCUTANEOUS at 08:13

## 2018-09-06 RX ADMIN — DIPHENHYDRAMINE HYDROCHLORIDE PRN MG: 50 INJECTION INTRAMUSCULAR; INTRAVENOUS at 01:56

## 2018-09-06 RX ADMIN — INSULIN GLARGINE SCH UNITS: 100 INJECTION, SOLUTION SUBCUTANEOUS at 17:15

## 2018-09-06 RX ADMIN — METHYLPREDNISOLONE SODIUM SUCCINATE SCH MG: 40 INJECTION, POWDER, FOR SOLUTION INTRAMUSCULAR; INTRAVENOUS at 06:03

## 2018-09-06 RX ADMIN — HUMAN INSULIN SCH UNIT: 100 INJECTION, SOLUTION SUBCUTANEOUS at 12:33

## 2018-09-06 RX ADMIN — DIPHENHYDRAMINE HYDROCHLORIDE PRN MG: 50 INJECTION INTRAMUSCULAR; INTRAVENOUS at 11:19

## 2018-09-06 RX ADMIN — HUMAN INSULIN SCH UNIT: 100 INJECTION, SOLUTION SUBCUTANEOUS at 17:15

## 2018-09-06 NOTE — CP.PCM.PN
Subjective





- Date & Time of Evaluation


Date of Evaluation: 09/06/18


Time of Evaluation: 14:24





- Subjective


Subjective: 





CHIEF COMPLAINTS TODAY :


afebrile,


LESS ITCHING


FEELING MUCH BETTER








ROS.


HEENT :  N.


Resp :       No cough, wheezing ,pleuritic CP ,or hemoptysis 


Cardio :     No anginal  CP, PND, orthopnea, palpitation 


GI :           No abd.pain, n/v ,diarrhea or GI bleeding .


CNS : No headache, vertigo, focal deficit.


Musculoskel :  No joint swelling ,


Derm   URTICARIAL RASH MUCH IMPROVED.


Psych :     Normal affect.


Ext :  No  swelling ,calf pain 





PE.


Pt. is alert awake in no distress.


V.S  As noted in the chart 


Head ,ear nose,throat and eyes : Normal.


Neck : Supple with normal carotids.


Lungs: Clear air entry.


Heart : S1 & S2 normal with S4. No murmur.


Abd : Soft non tender with normal bowel sounds.


Neuro : Moves all ext. with no localized deficit.


Ext : No edema with intact pulses. SWELLING OF THE PALMS AND SOLES IMPROVING. 

NONTENDER


Derm :  ERYTHEMATOUS RAISED WHEALS ON TRUNK AND UPPER EXTREMITIES FADING AWAY.





LABS/RADIOLOGY:


RPR-NONREACTIVE


ESR 13-NORMAL, 





CRP >15


ABELARDO+VE  POSITIVE- spectacled  1:40


c4 43.2 normal


SERUM IGE HIGH


SERUM IGM LOW


SERUM IGG N


SERUM IGA N





Objective





- Vital Signs/Intake and Output


Vital Signs (last 24 hours): 


 











Temp Pulse Resp BP Pulse Ox


 


 97.3 F L  64   20   132/69   95 


 


 09/06/18 07:37  09/06/18 07:37  09/06/18 07:37  09/06/18 10:55  09/06/18 08:00








Intake and Output: 


 











 09/06/18 09/06/18





 06:59 18:59


 


Intake Total 660 


 


Output Total 500 


 


Balance 160 














- Medications


Medications: 


 Current Medications





Carvedilol (Coreg)  12.5 mg PO BID [facility]


   Last Admin: 09/06/18 10:55 Dose:  12.5 mg


Diphenhydramine HCl (Benadryl)  50 mg IM Q8H PRN


   PRN Reason: Allergy symptoms


   Last Admin: 09/06/18 11:19 Dose:  50 mg


Docusate Sodium (Colace)  100 mg PO DAILY [facility]


   Last Admin: 09/06/18 10:55 Dose:  100 mg


Gabapentin (Neurontin)  300 mg PO BID [facility]


   Last Admin: 09/06/18 10:55 Dose:  300 mg


Heparin Sodium (Porcine) (Heparin)  5,000 units SC Q12 [facility]


   Last Admin: 09/06/18 10:55 Dose:  5,000 units


Home Med (Empagliflozin [Jardiance])  25 mg PO DAILY [facility]


Insulin Glargine (Lantus)  45 unit SC BID [facility]


   Last Admin: 09/06/18 10:55 Dose:  45 units


Insulin Human Regular (Novolin R)  0 unit SC ACHS [facility]


   PRN Reason: Protocol


   Last Admin: 09/06/18 12:33 Dose:  6 unit


Metformin HCl (Glucophage)  1,000 mg PO BIDCC [facility]


   Last Admin: 09/06/18 08:12 Dose:  1,000 mg


Methylprednisolone (Solu-Medrol)  40 mg IVP Q8 [facility]


   Last Admin: 09/06/18 06:03 Dose:  40 mg


Morphine Sulfate (Morphine)  4 mg IV Q6 PRN


   PRN Reason: Pain, moderate (4-7)


   Last Admin: 09/05/18 00:06 Dose:  4 mg


Pantoprazole Sodium (Protonix Ec Tab)  40 mg PO DAILY [facility]


Sitagliptin Phosphate (Januvia)  50 mg PO BID [facility]











- Labs


Labs: 


 





 09/06/18 08:54 





 09/06/18 08:54 











Assessment and Plan


(1) Urticaria


Assessment & Plan: 


MUCH IMPROVED





DC iv SOLU-mEDROL.





SWITCH TO PO MEDROL PACK IN AM as patient improving


continue PO PEPCID 20MG BID


 BLOOD CULTURES, URINE CULTURES -ve to date.





Etiology of urticaria not clear ? allergic versus autoimmune vasculitis vs 

idiopathic


CHECK HIV 1 AND 2 ANTIBODY.-P


T-CELL SUBSET STUDIES WITH CD4/CD8 RATIOS.-P





DISCUSSED WITH FAMILY. F/U WITH ALLERGY /IMMUNOLOGY AS OPD.


PT TOLD TO AVOID DRIVING AFTER TAKING BENADRYL


CAN TAKE IT AT BEDTIME.


Status: Acute   





(2) Diabetes mellitus


Status: Acute   





(3) Hypertension


Status: Acute

## 2018-09-06 NOTE — CP.PCM.DIS
Provider





- Provider


Date of Admission: 


09/04/18 12:10





Attending physician: 


Harvey Brennan MD





Time Spent in preparation of Discharge (in minutes): 36





Hospital Course





- Lab Results


Lab Results: 


 Micro Results





09/04/18 12:03   Urine,Random   Urine Culture - Final


                            No Growth (<1,000 CFU/ML)


09/04/18 10:43   Blood   Blood Culture - Preliminary


                            NO GROWTH AFTER 24 HOURS


09/04/18 11:00   Blood   Blood Culture - Preliminary


                            NO GROWTH AFTER 24 HOURS





 Most Recent Lab Values











WBC  11.1 K/uL (4.8-10.8)  H  09/06/18  08:54    


 


RBC  4.58 Mil/uL (4.40-5.90)   09/06/18  08:54    


 


Hgb  12.8 g/dL (12.0-18.0)  D 09/06/18  08:54    


 


Hct  37.9 % (35.0-51.0)   09/06/18  08:54    


 


MCV  82.7 fL (80.0-94.0)   09/06/18  08:54    


 


MCH  27.9 pg (27.0-31.0)   09/06/18  08:54    


 


MCHC  33.7 g/dL (33.0-37.0)   09/06/18  08:54    


 


RDW  13.3 % (11.5-14.5)   09/06/18  08:54    


 


Plt Count  203 K/uL (130-400)   09/06/18  08:54    


 


MPV  8.4 fL (7.2-11.7)   09/06/18  08:54    


 


Neut % (Auto)  87.8 % (50.0-75.0)  H  09/06/18  08:54    


 


Lymph % (Auto)  9.6 % (20.0-40.0)  L  09/06/18  08:54    


 


Mono % (Auto)  2.3 % (0.0-10.0)   09/06/18  08:54    


 


Eos % (Auto)  0.0 % (0.0-4.0)   09/06/18  08:54    


 


Baso % (Auto)  0.3 % (0.0-2.0)   09/06/18  08:54    


 


Neut # (Auto)  9.7 K/uL (1.8-7.0)  H  09/06/18  08:54    


 


Lymph # (Auto)  1.1 K/uL (1.0-4.3)   09/06/18  08:54    


 


Mono # (Auto)  0.3 K/uL (0.0-0.8)   09/06/18  08:54    


 


Eos # (Auto)  0.0 K/uL (0.0-0.7)   09/06/18  08:54    


 


Baso # (Auto)  0.0 K/uL (0.0-0.2)   09/06/18  08:54    


 


Neutrophils % (Manual)  89 % (50-75)  H  09/06/18  08:54    


 


Lymphocytes % (Manual)  10 % (20-40)  L  09/06/18  08:54    


 


Monocytes % (Manual)  1 % (0-10)   09/06/18  08:54    


 


Platelet Estimate  Normal  (NORMAL)   09/06/18  08:54    


 


ESR  13 mm/hr (0-15)   09/04/18  12:12    


 


pO2  49 mm/Hg (30-55)   09/04/18  11:47    


 


VBG pH  7.39  (7.32-7.43)   09/04/18  11:47    


 


VBG pCO2  32 mmHg (40-60)  L  09/04/18  11:47    


 


VBG HCO3  21.0 mmol/L  09/04/18  11:47    


 


VBG Total CO2  20.4 mmol/L (22-28)  L  09/04/18  11:47    


 


VBG O2 Sat (Calc)  88.5 % (40-65)  H  09/04/18  11:47    


 


VBG Base Excess  -4.6 mmol/L (0.0-2.0)  L  09/04/18  11:47    


 


VBG Potassium  3.8 mmol/L (3.6-5.2)   09/04/18  11:47    


 


Sodium  137.0 mmol/l (132-148)   09/04/18  11:47    


 


Chloride  109.0 mmol/L ()  H  09/04/18  11:47    


 


Glucose  235 mg/dl ()  H  09/04/18  11:47    


 


Lactate  2.5 mmol/L (0.7-2.1)  H  09/04/18  11:47    


 


Sodium  139 mmol/L (132-148)   09/06/18  08:54    


 


Potassium  4.8 mmol/L (3.6-5.2)   09/06/18  08:54    


 


Chloride  102 mmol/L ()   09/06/18  08:54    


 


Carbon Dioxide  25 mmol/L (22-30)   09/06/18  08:54    


 


Anion Gap  16  (10-20)   09/06/18  08:54    


 


BUN  24 mg/dL (9-20)  H  09/06/18  08:54    


 


Creatinine  0.8 mg/dL (0.8-1.5)   09/06/18  08:54    


 


Est GFR ( Amer)  > 60   09/06/18  08:54    


 


Est GFR (Non-Af Amer)  > 60   09/06/18  08:54    


 


POC Glucose (mg/dL)  306 mg/dL ()  H  09/06/18  11:34    


 


Random Glucose  381 mg/dL ()  H  09/06/18  08:54    


 


Calcium  8.8 mg/dl (8.6-10.4)   09/06/18  08:54    


 


Total Bilirubin  0.4 mg/dL (0.2-1.3)   09/06/18  08:54    


 


AST  34 U/L (17-59)   09/06/18  08:54    


 


ALT  26 U/L (21-72)   09/06/18  08:54    


 


Alkaline Phosphatase  59 U/L ()   09/06/18  08:54    


 


C-React Prot High Sens  > 15.00 mg/L (1.00-3.00)  H  09/04/18  12:12    


 


Total Protein  6.1 g/dL (6.3-8.3)  L  09/06/18  08:54    


 


Albumin  3.5 g/dL (3.5-5.0)   09/06/18  08:54    


 


Globulin  2.6 gm/dL (2.2-3.9)   09/06/18  08:54    


 


Albumin/Globulin Ratio  1.3  (1.0-2.1)   09/06/18  08:54    


 


Vitamin B12  439 pg/mL (239-931)   09/04/18  19:35    


 


Venous Blood Potassium  3.8 mmol/L (3.6-5.2)   09/04/18  11:47    


 


Urine Color  Yellow  (YELLOW)   09/04/18  12:03    


 


Urine Clarity  Clear  (Clear)   09/04/18  12:03    


 


Urine pH  5.0  (5.0-8.0)   09/04/18  12:03    


 


Ur Specific Gravity  1.029  (1.003-1.030)   09/04/18  12:03    


 


Urine Protein  Negative mg/dL (NEGATIVE)   09/04/18  12:03    


 


Urine Glucose (UA)  3+ mg/dL (Normal)  H  09/04/18  12:03    


 


Urine Ketones  Negative mg/dL (NEGATIVE)   09/04/18  12:03    


 


Urine Blood  Negative  (NEGATIVE)   09/04/18  12:03    


 


Urine Nitrate  Negative  (NEGATIVE)   09/04/18  12:03    


 


Urine Bilirubin  Negative  (NEGATIVE)   09/04/18  12:03    


 


Urine Urobilinogen  Normal mg/dL (0.2-1.0)   09/04/18  12:03    


 


Ur Leukocyte Esterase  Neg Jose/uL (Negative)   09/04/18  12:03    


 


Urine WBC (Auto)  < 1 /hpf (0-5)   09/04/18  12:03    


 


IgG  758.3 mg/dL (700.0-1600.0)   09/05/18  07:13    


 


IgA  268.8 mg/dL (70.0-400.0)   09/05/18  07:13    


 


IgM  < 25.0 mg/dL (40.0-230.0)  L  09/05/18  07:13    


 


IgE  154 kU/L (<zn=890)  H  09/05/18  07:13    


 


ABELARDO 6 Profile  Positive  (NEGATIVE)  H  09/05/18  07:13    


 


ABELARDO Titer  1:40  H  09/05/18  07:13    


 


ABELARDO Pattern  Speckled  H  09/05/18  07:13    


 


Complement C4  43.2 mg/dL (14.0-44.0)   09/05/18  07:13    


 


RPR  Nonreactive  (NONREACTIVE)   09/05/18  07:13    


 


HIV 1&2 Antibody Screen  Negative  (NEGATIVE)   09/06/18  08:54    














- Hospital Course


Hospital Course: 





Pain in the bilateral feet and hands secondary to new skin lesion


2 days ago patient broke out skin lesions urticarial type both feet in the 

process of the upper and lower extremities.  Presented to [facility] was given oral antibiotics and prednisone without any relief 

patient continues to have increased pain in both feet and unable to walk.


Patient has a history of diabetes no previous history of any allergies or skin 

lesions or travel outside the country





patient responded with IV steroids and Benadryl around the clock.  ID consult 

was obtained.


IgE levels were elevated  IN IgM levels were low ABELARDO was positive with speckled 

pattern.


This was discussed with the family to follow with the allergist and 

immunologist and rheumatologist.


Patient has no further swelling and extremities or the face.





Discharge Exam





- Head Exam


Head Exam: NORMAL INSPECTION, NORMOCEPHALIC





Discharge Plan





- Follow Up Plan


Condition: GUARDED


Disposition: HOME/ ROUTINE

## 2018-09-07 LAB
% CD4 (T HELPER CELL): 35 PERCENT (ref 30–61)
% CD8 (SUPPRESSOR T CELL): 35 PERCENT (ref 12–42)
ABSOLUTE CD4 CELLS: 409 CELLS/MCL (ref 490–1740)
ABSOLUTE CD8 CELLS: 411 CELLS/MCL (ref 180–1170)
ABSOLUTE LYMPHOCYTES: 1182 CELLS/MCL (ref 850–3900)
HELPER/SUPPRESSOR RATIO: 1 RATIO (ref 0.86–5)
MYCOPLASMA PNEUMONIAE IGM: NEGATIVE

## 2021-02-04 ENCOUNTER — CONSULT EP (OUTPATIENT)
Dept: URBAN - METROPOLITAN AREA CLINIC 73 | Facility: CLINIC | Age: 66
End: 2021-02-04

## 2021-02-04 DIAGNOSIS — Z96.1: ICD-10-CM

## 2021-02-04 DIAGNOSIS — Z79.4: ICD-10-CM

## 2021-02-04 DIAGNOSIS — H43.12: ICD-10-CM

## 2021-02-04 DIAGNOSIS — H35.371: ICD-10-CM

## 2021-02-04 DIAGNOSIS — H26.493: ICD-10-CM

## 2021-02-04 DIAGNOSIS — E11.3593: ICD-10-CM

## 2021-02-04 PROCEDURE — 92250 FUNDUS PHOTOGRAPHY W/I&R: CPT

## 2021-02-04 PROCEDURE — 99204 OFFICE O/P NEW MOD 45 MIN: CPT

## 2021-02-04 PROCEDURE — 92134 CPTRZ OPH DX IMG PST SGM RTA: CPT

## 2021-02-04 PROCEDURE — 92287 ANT SGM IMG IR FLRSCN ANGRPH: CPT

## 2021-02-04 PROCEDURE — 92235 FLUORESCEIN ANGRPH MLTIFRAME: CPT

## 2021-02-04 PROCEDURE — 92201 OPSCPY EXTND RTA DRAW UNI/BI: CPT

## 2021-02-04 ASSESSMENT — TONOMETRY
OD_IOP_MMHG: 14
OS_IOP_MMHG: 12

## 2021-02-04 ASSESSMENT — VISUAL ACUITY
OS_SC: 20/40
OD_SC: 20/50
OD_PH: 20/30-1

## 2021-02-23 ENCOUNTER — PROCEDURE ONLY (OUTPATIENT)
Dept: URBAN - METROPOLITAN AREA CLINIC 73 | Facility: CLINIC | Age: 66
End: 2021-02-23

## 2021-02-23 VITALS — HEIGHT: 60 IN

## 2021-02-23 DIAGNOSIS — E11.3593: ICD-10-CM

## 2021-02-23 DIAGNOSIS — Z79.4: ICD-10-CM

## 2021-02-23 PROCEDURE — 67028 INJECTION EYE DRUG: CPT

## 2021-02-23 ASSESSMENT — VISUAL ACUITY
OS_SC: 20/30
OD_SC: 20/60
OD_PH: 20/40

## 2021-02-23 ASSESSMENT — TONOMETRY
OD_IOP_MMHG: 14
OS_IOP_MMHG: 10